# Patient Record
Sex: FEMALE | Race: WHITE
[De-identification: names, ages, dates, MRNs, and addresses within clinical notes are randomized per-mention and may not be internally consistent; named-entity substitution may affect disease eponyms.]

---

## 2021-12-20 ENCOUNTER — HOSPITAL ENCOUNTER (OUTPATIENT)
Dept: HOSPITAL 96 - M.CL | Age: 21
Discharge: HOME | End: 2021-12-20
Attending: INTERNAL MEDICINE
Payer: COMMERCIAL

## 2021-12-20 VITALS — DIASTOLIC BLOOD PRESSURE: 79 MMHG | SYSTOLIC BLOOD PRESSURE: 109 MMHG

## 2021-12-20 VITALS — DIASTOLIC BLOOD PRESSURE: 82 MMHG | SYSTOLIC BLOOD PRESSURE: 106 MMHG

## 2021-12-20 VITALS — DIASTOLIC BLOOD PRESSURE: 68 MMHG | SYSTOLIC BLOOD PRESSURE: 109 MMHG

## 2021-12-20 VITALS — SYSTOLIC BLOOD PRESSURE: 110 MMHG | DIASTOLIC BLOOD PRESSURE: 80 MMHG

## 2021-12-20 VITALS — SYSTOLIC BLOOD PRESSURE: 87 MMHG | DIASTOLIC BLOOD PRESSURE: 44 MMHG

## 2021-12-20 VITALS — DIASTOLIC BLOOD PRESSURE: 84 MMHG | SYSTOLIC BLOOD PRESSURE: 114 MMHG

## 2021-12-20 VITALS — DIASTOLIC BLOOD PRESSURE: 78 MMHG | SYSTOLIC BLOOD PRESSURE: 112 MMHG

## 2021-12-20 VITALS — DIASTOLIC BLOOD PRESSURE: 81 MMHG | SYSTOLIC BLOOD PRESSURE: 111 MMHG

## 2021-12-20 VITALS — SYSTOLIC BLOOD PRESSURE: 110 MMHG | DIASTOLIC BLOOD PRESSURE: 79 MMHG

## 2021-12-20 VITALS — DIASTOLIC BLOOD PRESSURE: 80 MMHG | SYSTOLIC BLOOD PRESSURE: 109 MMHG

## 2021-12-20 VITALS — SYSTOLIC BLOOD PRESSURE: 101 MMHG | DIASTOLIC BLOOD PRESSURE: 75 MMHG

## 2021-12-20 VITALS — SYSTOLIC BLOOD PRESSURE: 130 MMHG | DIASTOLIC BLOOD PRESSURE: 91 MMHG

## 2021-12-20 VITALS — DIASTOLIC BLOOD PRESSURE: 73 MMHG | SYSTOLIC BLOOD PRESSURE: 109 MMHG

## 2021-12-20 VITALS — DIASTOLIC BLOOD PRESSURE: 86 MMHG | SYSTOLIC BLOOD PRESSURE: 127 MMHG

## 2021-12-20 VITALS — SYSTOLIC BLOOD PRESSURE: 111 MMHG | DIASTOLIC BLOOD PRESSURE: 61 MMHG

## 2021-12-20 VITALS — DIASTOLIC BLOOD PRESSURE: 87 MMHG | SYSTOLIC BLOOD PRESSURE: 122 MMHG

## 2021-12-20 VITALS — SYSTOLIC BLOOD PRESSURE: 106 MMHG | DIASTOLIC BLOOD PRESSURE: 82 MMHG

## 2021-12-20 VITALS — DIASTOLIC BLOOD PRESSURE: 86 MMHG | SYSTOLIC BLOOD PRESSURE: 124 MMHG

## 2021-12-20 DIAGNOSIS — Z79.899: ICD-10-CM

## 2021-12-20 DIAGNOSIS — R00.0: ICD-10-CM

## 2021-12-20 DIAGNOSIS — R55: Primary | ICD-10-CM

## 2021-12-21 NOTE — PROC
[address]  [address]                    PROCEDURE REPORT              
_______________________________________________________________________________
 
Name:       YENY TA         Room:                      REG CLI 
M.R.#:  [id number]      Account #:      [id number]  
Admission:  12/20/21     Attend Phys:    Romel Shelton MD, F
Discharge:               Date of Birth:  [birth year]  
         Report #: [id number]
                                                                     [id number]
_______________________________________________________________________________
THIS REPORT FOR:  
 
cc:  Izzy Phelan Jacqueline D. FNP Blick, David R. MD [facility]                                            ~
 
cc:     Izzy Phelan NP
DATE OF PROCEDURE: 12/20/2021
 
TYPE OF PROCEDURE:  Tilt table test.
 
INDICATIONS:  Tilt table test was requested in this patient with a history of 
syncope.
 
DESCRIPTION OF PROCEDURE:  The patient was placed in the supine position and 
resting heart rate and blood pressure was obtained.  ECG monitoring was 
performed throughout the procedure.  The patient was then placed in the supine 
position at 70 degrees for 20 minutes.  After 20 minutes, the patient was given 
nitroglycerin 0.4 mg sublingually.
 
RESULTS:  The patient had a pretest heart rate of 87, blood pressure 108/75.  
The patient was then placed in the 70 degrees head upright position.  At this 
time, the heart rate was 92, blood pressure 114/84 and the patient was without 
complaints.  The patient was then left in the head upright position for 20 
minutes.  After 10 minutes, the patient had a blood pressure 110/78 and a heart 
rate of 132.  The patient did complain of being lightheaded.  After 20 minutes, 
the blood pressure 122/86 with a heart rate of 100.  The patient remained in 
sinus tachycardia.  The patient was given nitroglycerin 0.4 mg sublingually.  
After 5 more minutes, the patient complained of feeling warm and had a heart 
rate of 142 in a sinus tachycardia with a blood pressure 110/60.  The patient 
complained to being short of breath.  She then lost consciousness 5 minutes 
after receiving nitroglycerin, at which time, her heart rate is 148 with a blood
pressure 88/40.  The patient was placed back into the supine position and 
regained consciousness.  At this time, the patient had a blood pressure of 86/44
and a heart rate of 115.  After recovery, the patient had a heart rate of 110/74
and a pulse of 70.
 
IMPRESSION:  Evidence of inappropriate sinus tachycardia with patient developed 
significant sinus tachycardia while in the upright position at 70 degrees after 
receiving nitroglycerin 0.4 mg sublingually.
 
The patient did develop hypotension after receiving nitroglycerin 0.4 mg 
sublingually.
 
 
 
 
[place] [address]                    PROCEDURE REPORT              
_______________________________________________________________________________
 
Name:       YENY TA         Room:                      REG CLI 
M.R.#:  [id number]      Account #:      [id number]  
Admission:  12/20/21     Attend Phys:    Romel Shelton MD, F
Discharge:               Date of Birth:  [birth year]  
         Report #: [id number]
                                                                     [id number]
_______________________________________________________________________________
The patient did have a vasodepressor response to nitroglycerin, but there was no
negative chronotropic response.
 
 
 
 
 
 
 
 
 
 
 
 
 
 
 
 
 
 
 
 
 
 
 
 
 
 
 
 
 
 
 
 
 
 
 
 
 
 
 
 
 
 
<ELECTRONICALLY SIGNED>
                                        By:  Romel Shelton MD, FACC      
12/21/21     1332
D: 12/20/21 1741_______________________________________
T: 12/20/21 2053David LIANE Shelton MD, FAC         /nt